# Patient Record
Sex: MALE | Race: WHITE | URBAN - METROPOLITAN AREA
[De-identification: names, ages, dates, MRNs, and addresses within clinical notes are randomized per-mention and may not be internally consistent; named-entity substitution may affect disease eponyms.]

---

## 2021-08-11 ENCOUNTER — OFFICE VISIT (OUTPATIENT)
Dept: URBAN - METROPOLITAN AREA CLINIC 81 | Facility: CLINIC | Age: 66
End: 2021-08-11
Payer: COMMERCIAL

## 2021-08-11 DIAGNOSIS — H52.4 PRESBYOPIA: ICD-10-CM

## 2021-08-11 DIAGNOSIS — H25.13 AGE-RELATED NUCLEAR CATARACT, BILATERAL: Primary | ICD-10-CM

## 2021-08-11 PROCEDURE — 92004 COMPRE OPH EXAM NEW PT 1/>: CPT | Performed by: OPTOMETRIST

## 2021-08-11 ASSESSMENT — VISUAL ACUITY
OS: 20/25
OD: 20/20

## 2021-08-11 ASSESSMENT — KERATOMETRY
OD: 44.63
OS: 44.63

## 2021-08-11 ASSESSMENT — INTRAOCULAR PRESSURE
OS: 12
OD: 12

## 2021-08-11 NOTE — IMPRESSION/PLAN
Impression: Presbyopia: H52.4. Plan: Dispensed Rx for glasses to patient and instructed on normal adaptation period. Education on benefits and limitations of PALS.

## 2024-09-19 ENCOUNTER — OFFICE VISIT (OUTPATIENT)
Dept: URBAN - METROPOLITAN AREA CLINIC 81 | Facility: CLINIC | Age: 69
End: 2024-09-19
Payer: COMMERCIAL

## 2024-09-19 DIAGNOSIS — H52.4 PRESBYOPIA: ICD-10-CM

## 2024-09-19 DIAGNOSIS — H25.13 AGE-RELATED NUCLEAR CATARACT, BILATERAL: Primary | ICD-10-CM

## 2024-09-19 PROCEDURE — 92004 COMPRE OPH EXAM NEW PT 1/>: CPT | Performed by: OPTOMETRIST

## 2024-09-19 ASSESSMENT — INTRAOCULAR PRESSURE
OS: 12
OD: 12

## 2024-09-19 ASSESSMENT — KERATOMETRY
OD: 44.63
OS: 45.00

## 2024-09-19 ASSESSMENT — VISUAL ACUITY
OS: 20/20
OD: 20/20